# Patient Record
Sex: FEMALE | Race: WHITE | NOT HISPANIC OR LATINO | Employment: UNEMPLOYED | URBAN - METROPOLITAN AREA
[De-identification: names, ages, dates, MRNs, and addresses within clinical notes are randomized per-mention and may not be internally consistent; named-entity substitution may affect disease eponyms.]

---

## 2024-04-12 VITALS
TEMPERATURE: 97.2 F | RESPIRATION RATE: 19 BRPM | HEIGHT: 59 IN | WEIGHT: 105 LBS | DIASTOLIC BLOOD PRESSURE: 71 MMHG | BODY MASS INDEX: 21.17 KG/M2 | OXYGEN SATURATION: 99 % | SYSTOLIC BLOOD PRESSURE: 125 MMHG | HEART RATE: 95 BPM

## 2024-04-12 PROCEDURE — 99283 EMERGENCY DEPT VISIT LOW MDM: CPT | Performed by: EMERGENCY MEDICINE

## 2024-04-12 ASSESSMENT — COLUMBIA-SUICIDE SEVERITY RATING SCALE - C-SSRS
1. IN THE PAST MONTH, HAVE YOU WISHED YOU WERE DEAD OR WISHED YOU COULD GO TO SLEEP AND NOT WAKE UP?: NO
6. HAVE YOU EVER DONE ANYTHING, STARTED TO DO ANYTHING, OR PREPARED TO DO ANYTHING TO END YOUR LIFE?: NO
2. HAVE YOU ACTUALLY HAD ANY THOUGHTS OF KILLING YOURSELF?: NO

## 2024-04-12 ASSESSMENT — PAIN SCALES - GENERAL: PAINLEVEL_OUTOF10: 8

## 2024-04-12 ASSESSMENT — PAIN DESCRIPTION - PAIN TYPE: TYPE: ACUTE PAIN

## 2024-04-12 ASSESSMENT — PAIN DESCRIPTION - LOCATION: LOCATION: TEETH

## 2024-04-12 ASSESSMENT — PAIN - FUNCTIONAL ASSESSMENT: PAIN_FUNCTIONAL_ASSESSMENT: 0-10

## 2024-04-12 ASSESSMENT — PAIN DESCRIPTION - DESCRIPTORS: DESCRIPTORS: ACHING;THROBBING;HEADACHE

## 2024-04-13 ENCOUNTER — HOSPITAL ENCOUNTER (EMERGENCY)
Facility: HOSPITAL | Age: 29
Discharge: HOME | End: 2024-04-13
Attending: EMERGENCY MEDICINE
Payer: COMMERCIAL

## 2024-04-13 DIAGNOSIS — K08.89 PAIN, DENTAL: Primary | ICD-10-CM

## 2024-04-13 RX ORDER — AMOXICILLIN 500 MG/1
500 TABLET, FILM COATED ORAL 3 TIMES DAILY
Qty: 30 TABLET | Refills: 0 | Status: SHIPPED | OUTPATIENT
Start: 2024-04-13 | End: 2024-04-23

## 2024-04-13 NOTE — ED PROVIDER NOTES
HPI   Chief Complaint   Patient presents with   • Dental Pain   • Oral Swelling       HPI: []  28-year-old white female no medical history comes in with dental pain.  She said for last 24 hours developed pain in her right upper last molar tooth.  No jaw swelling.  No trauma no falls.  No ear pain.  No tinnitus.  No headache.  No chest pain pressure heaviness fever chills cough congestion incontinence seizures syncope or near syncope.    Past history: None  Social: Patient denies tobacco alcohol drug abuse.  REVIEW OF SYSTEMS:    GENERAL.: No weight loss, fatigue, anorexia, insomnia, fever.    EYES: No vision loss, double vision, drainage, eye pain.    ENT: No pharyngitis, dry mouth.  Positive dental pain    CARDIOPULMONARY: No chest pain, palpitations, syncope, near syncope. No shortness of breath, cough, hemoptysis.    GI: No abdominal pain, change in bowel habits, melena, hematemesis, hematochezia, nausea, vomiting, diarrhea.    : No discharge, dysuria, frequency, urgency, hematuria.    MS: No limb pain, joint pain, joint swelling.    SKIN: No rashes.    PSYCH: No depression, anxiety, suicidality, homicidality.    Review of systems is otherwise negative unless stated above or in history of present illness.  Social history, family history, allergies reviewed.  PHYSICAL EXAM:    GENERAL: Vitals noted, no distress. Alert and oriented  x 3. Non-toxic.  Multiple facial piercings    EENT: TMs clear. Posterior oropharynx unremarkable. No meningismus. No LAD.  The last upper right molar tooth has very mild tenderness with some brent odontal  tenderness no obvious abscess.  No jaw swelling.    NECK: Supple. Nontender. No midline tenderness.     CARDIAC: Deferred    PULMONARY: Deferred    ABDOMEN: Deferred    EXTREMITIES: No peripheral edema. Negative Homans bilaterally, no cords.    SKIN: No rash. Intact.     NEURO: Grossly intact    MEDICAL DECISION MAKING:  Impression: #1 dental pain/infection  Plans and MDM: 28 female  comes in with dental pain no obvious abscess low concern for abscess or osteomyelitis or trench mouth, will be discharged home with amoxicillin NSAIDs dentistry follow-up with return precaution.                          Juan Coma Scale Score: 15                     Patient History   No past medical history on file.  No past surgical history on file.  No family history on file.  Social History     Tobacco Use   • Smoking status: Not on file   • Smokeless tobacco: Not on file   Substance Use Topics   • Alcohol use: Not on file   • Drug use: Not on file       Physical Exam   ED Triage Vitals [04/12/24 2259]   Temperature Heart Rate Respirations BP   36.2 °C (97.2 °F) 95 19 125/71      Pulse Ox Temp Source Heart Rate Source Patient Position   99 % Temporal Monitor --      BP Location FiO2 (%)     -- --       Physical Exam    ED Course & [facility]   ED Course as of 04/13/24 0233   Sat Apr 13, 2024   0230 On exam patient is comfortable she has no upper jaw swelling.  She does have some tenderness to palpation of her right upper last molar tooth with some mildly periodontal tenderness but no obvious abscess.  Patient be discharged home with NSAIDs for pain control, amoxicillin outpatient follow-up with dentistry with strict return precaution. [MT]      ED Course User Index  [MT] Isaias Schroeder MD         Diagnoses as of 04/13/24 0233   Pain, dental       Medical Decision Making      Procedure  Procedures     Isaias Schroeder MD  04/13/24 0233

## 2024-04-13 NOTE — ED TRIAGE NOTES
Patient arrived to ED from home with c/o tooth pain that is causing HA, jaw pain, neck and back pain. Patient has not been to dentist in about a year, so she's not sure what the problem is. Patient reports pressure to back of right eye. Patient denies bleeding of gums, denies difficulty breathing or swallowing. Patient is able to swallow but is having a hard time chewing. Patient reports possible blood in tooth but not around or from gums. Patient stable at this time.

## 2024-10-01 ENCOUNTER — LAB (OUTPATIENT)
Dept: LAB | Facility: LAB | Age: 29
End: 2024-10-01
Payer: COMMERCIAL

## 2024-10-01 ENCOUNTER — APPOINTMENT (OUTPATIENT)
Dept: PRIMARY CARE | Facility: CLINIC | Age: 29
End: 2024-10-01
Payer: COMMERCIAL

## 2024-10-01 VITALS
HEIGHT: 61 IN | DIASTOLIC BLOOD PRESSURE: 70 MMHG | BODY MASS INDEX: 19.75 KG/M2 | SYSTOLIC BLOOD PRESSURE: 120 MMHG | HEART RATE: 90 BPM | OXYGEN SATURATION: 100 % | TEMPERATURE: 97.4 F | WEIGHT: 104.6 LBS

## 2024-10-01 DIAGNOSIS — Z13.21 ENCOUNTER FOR SCREENING FOR NUTRITIONAL DISORDER: ICD-10-CM

## 2024-10-01 DIAGNOSIS — Z13.1 SCREENING FOR DIABETES MELLITUS: ICD-10-CM

## 2024-10-01 DIAGNOSIS — Z11.4 SCREENING FOR HIV (HUMAN IMMUNODEFICIENCY VIRUS): ICD-10-CM

## 2024-10-01 DIAGNOSIS — Q79.60 EHLERS-DANLOS DISEASE (HHS-HCC): ICD-10-CM

## 2024-10-01 DIAGNOSIS — Z13.0 SCREENING FOR DEFICIENCY ANEMIA: ICD-10-CM

## 2024-10-01 DIAGNOSIS — Z11.59 NEED FOR HEPATITIS C SCREENING TEST: ICD-10-CM

## 2024-10-01 DIAGNOSIS — Z13.220 SCREENING FOR HYPERLIPIDEMIA: ICD-10-CM

## 2024-10-01 DIAGNOSIS — K04.7 TOOTH INFECTION: ICD-10-CM

## 2024-10-01 DIAGNOSIS — Z00.00 WELLNESS EXAMINATION: Primary | ICD-10-CM

## 2024-10-01 LAB
25(OH)D3 SERPL-MCNC: 16 NG/ML (ref 30–100)
ALBUMIN SERPL BCP-MCNC: 4.7 G/DL (ref 3.4–5)
ALP SERPL-CCNC: 41 U/L (ref 33–110)
ALT SERPL W P-5'-P-CCNC: 12 U/L (ref 7–45)
ANION GAP SERPL CALC-SCNC: 11 MMOL/L (ref 10–20)
AST SERPL W P-5'-P-CCNC: 15 U/L (ref 9–39)
BASOPHILS # BLD AUTO: 0.09 X10*3/UL (ref 0–0.1)
BASOPHILS NFR BLD AUTO: 1.1 %
BILIRUB SERPL-MCNC: 0.8 MG/DL (ref 0–1.2)
BUN SERPL-MCNC: 4 MG/DL (ref 6–23)
CALCIUM SERPL-MCNC: 9.8 MG/DL (ref 8.6–10.3)
CHLORIDE SERPL-SCNC: 104 MMOL/L (ref 98–107)
CHOLEST SERPL-MCNC: 221 MG/DL (ref 0–199)
CHOLESTEROL/HDL RATIO: 2.6
CO2 SERPL-SCNC: 29 MMOL/L (ref 21–32)
CREAT SERPL-MCNC: 0.58 MG/DL (ref 0.5–1.05)
EGFRCR SERPLBLD CKD-EPI 2021: >90 ML/MIN/1.73M*2
EOSINOPHIL # BLD AUTO: 0.13 X10*3/UL (ref 0–0.7)
EOSINOPHIL NFR BLD AUTO: 1.5 %
ERYTHROCYTE [DISTWIDTH] IN BLOOD BY AUTOMATED COUNT: 12.1 % (ref 11.5–14.5)
FERRITIN SERPL-MCNC: 58 NG/ML (ref 8–150)
GLUCOSE SERPL-MCNC: 97 MG/DL (ref 74–99)
HCT VFR BLD AUTO: 44.4 % (ref 36–46)
HDLC SERPL-MCNC: 84.5 MG/DL
HGB BLD-MCNC: 14.5 G/DL (ref 12–16)
IMM GRANULOCYTES # BLD AUTO: 0.02 X10*3/UL (ref 0–0.7)
IMM GRANULOCYTES NFR BLD AUTO: 0.2 % (ref 0–0.9)
IRON SATN MFR SERPL: 28 % (ref 25–45)
IRON SERPL-MCNC: 105 UG/DL (ref 35–150)
LDLC SERPL CALC-MCNC: 120 MG/DL
LYMPHOCYTES # BLD AUTO: 3.43 X10*3/UL (ref 1.2–4.8)
LYMPHOCYTES NFR BLD AUTO: 40.8 %
MCH RBC QN AUTO: 29.8 PG (ref 26–34)
MCHC RBC AUTO-ENTMCNC: 32.7 G/DL (ref 32–36)
MCV RBC AUTO: 91 FL (ref 80–100)
MONOCYTES # BLD AUTO: 0.37 X10*3/UL (ref 0.1–1)
MONOCYTES NFR BLD AUTO: 4.4 %
NEUTROPHILS # BLD AUTO: 4.36 X10*3/UL (ref 1.2–7.7)
NEUTROPHILS NFR BLD AUTO: 52 %
NON HDL CHOLESTEROL: 137 MG/DL (ref 0–149)
NRBC BLD-RTO: 0 /100 WBCS (ref 0–0)
PLATELET # BLD AUTO: 338 X10*3/UL (ref 150–450)
POTASSIUM SERPL-SCNC: 4.3 MMOL/L (ref 3.5–5.3)
PROT SERPL-MCNC: 7.5 G/DL (ref 6.4–8.2)
RBC # BLD AUTO: 4.86 X10*6/UL (ref 4–5.2)
SODIUM SERPL-SCNC: 140 MMOL/L (ref 136–145)
TIBC SERPL-MCNC: 376 UG/DL (ref 240–445)
TRIGL SERPL-MCNC: 83 MG/DL (ref 0–149)
TSH SERPL-ACNC: 1.24 MIU/L (ref 0.44–3.98)
UIBC SERPL-MCNC: 271 UG/DL (ref 110–370)
VIT B12 SERPL-MCNC: 223 PG/ML (ref 211–911)
VLDL: 17 MG/DL (ref 0–40)
WBC # BLD AUTO: 8.4 X10*3/UL (ref 4.4–11.3)

## 2024-10-01 PROCEDURE — 82728 ASSAY OF FERRITIN: CPT

## 2024-10-01 PROCEDURE — 82306 VITAMIN D 25 HYDROXY: CPT

## 2024-10-01 PROCEDURE — 85025 COMPLETE CBC W/AUTO DIFF WBC: CPT

## 2024-10-01 PROCEDURE — 83540 ASSAY OF IRON: CPT

## 2024-10-01 PROCEDURE — 86803 HEPATITIS C AB TEST: CPT

## 2024-10-01 PROCEDURE — 84443 ASSAY THYROID STIM HORMONE: CPT

## 2024-10-01 PROCEDURE — 87389 HIV-1 AG W/HIV-1&-2 AB AG IA: CPT

## 2024-10-01 PROCEDURE — 80053 COMPREHEN METABOLIC PANEL: CPT

## 2024-10-01 PROCEDURE — 83550 IRON BINDING TEST: CPT

## 2024-10-01 PROCEDURE — 83036 HEMOGLOBIN GLYCOSYLATED A1C: CPT

## 2024-10-01 PROCEDURE — 80061 LIPID PANEL: CPT

## 2024-10-01 PROCEDURE — 99395 PREV VISIT EST AGE 18-39: CPT | Performed by: FAMILY MEDICINE

## 2024-10-01 PROCEDURE — 82607 VITAMIN B-12: CPT

## 2024-10-01 PROCEDURE — 99213 OFFICE O/P EST LOW 20 MIN: CPT | Performed by: FAMILY MEDICINE

## 2024-10-01 PROCEDURE — 1036F TOBACCO NON-USER: CPT | Performed by: FAMILY MEDICINE

## 2024-10-01 PROCEDURE — 3008F BODY MASS INDEX DOCD: CPT | Performed by: FAMILY MEDICINE

## 2024-10-01 PROCEDURE — 36415 COLL VENOUS BLD VENIPUNCTURE: CPT

## 2024-10-01 ASSESSMENT — ENCOUNTER SYMPTOMS
FEVER: 0
SHORTNESS OF BREATH: 0
NAUSEA: 0
DIARRHEA: 0
ABDOMINAL PAIN: 0
DYSURIA: 0
COUGH: 0
VOMITING: 0
CHILLS: 0

## 2024-10-01 ASSESSMENT — PATIENT HEALTH QUESTIONNAIRE - PHQ9
1. LITTLE INTEREST OR PLEASURE IN DOING THINGS: NOT AT ALL
SUM OF ALL RESPONSES TO PHQ9 QUESTIONS 1 AND 2: 0
2. FEELING DOWN, DEPRESSED OR HOPELESS: NOT AT ALL

## 2024-10-01 NOTE — PROGRESS NOTES
"Subjective   Patient ID: Joao Moreno is a 29 y.o. female who presents for Annual Exam, hypermobility, and tooth infection.    Gloria has been dealing with some life stress.  She currently is in court regarding a evie.  Is following with a counselor and feels that she has good support.    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  Patient is concerned that she may have this to as she has noticed significant joint hypermobility in her elbows and knees.    Over the last few months she has had recurrent infections in her teeth.  Dentist is concerned there could be an underlying cause.  Recommended labs for further evaluation.         Review of Systems   Constitutional:  Negative for chills and fever.   Respiratory:  Negative for cough and shortness of breath.    Cardiovascular:  Negative for chest pain.   Gastrointestinal:  Negative for abdominal pain, diarrhea, nausea and vomiting.   Genitourinary:  Negative for dysuria.       Objective   /70   Pulse 90   Temp 36.3 °C (97.4 °F)   Ht 1.56 m (5' 1.42\")   Wt 47.4 kg (104 lb 9.6 oz)   SpO2 100%   BMI 19.50 kg/m²     Physical Exam  Constitutional:       General: She is not in acute distress.     Appearance: Normal appearance.   HENT:      Head: Normocephalic.      Mouth/Throat:      Mouth: Mucous membranes are moist.   Eyes:      Extraocular Movements: Extraocular movements intact.      Conjunctiva/sclera: Conjunctivae normal.   Cardiovascular:      Rate and Rhythm: Normal rate and regular rhythm.      Heart sounds: No murmur heard.  Pulmonary:      Breath sounds: No wheezing or rhonchi.   Musculoskeletal:      Cervical back: Neck supple.   Skin:     General: Skin is warm and dry.   Neurological:      Mental Status: She is alert.   Psychiatric:         Mood and Affect: Mood normal.         Behavior: Behavior normal.         Assessment/Plan   Problem List Items Addressed This Visit    None  Visit Diagnoses         " Codes    Wellness examination    -  Primary Z00.00    CPE performed. Vaccines reviewed. Routine labs ordered. Patient will schedule Pap smear.     Sloan-Danlos disease ([facility])     Q79.60    Possible Ehler's Danlos; patient does meet some criteria. Refer to genetics for testing.     Relevant Orders    Referral to Genetics    Tooth infection     K04.7    Following with dentist. Check labs to ensure no underlying cause for recurrent infection.     Screening for hyperlipidemia     Z13.220    Relevant Orders    Lipid Panel    Screening for diabetes mellitus     Z13.1    Relevant Orders    Comprehensive Metabolic Panel    Hemoglobin A1C    Screening for deficiency anemia     Z13.0    Relevant Orders    CBC and Auto Differential    Encounter for screening for nutritional disorder     Z13.21    Relevant Orders    TSH with reflex to Free T4 if abnormal    Vitamin D 25-Hydroxy,Total (for eval of Vitamin D levels)    Vitamin B12    Ferritin    Iron and TIBC    Screening for HIV (human immunodeficiency virus)     Z11.4    Relevant Orders    HIV 1/2 Antigen/Antibody Screen with Reflex to Confirmation    Need for hepatitis C screening test     Z11.59    Relevant Orders    Hepatitis C Antibody

## 2024-10-02 LAB
EST. AVERAGE GLUCOSE BLD GHB EST-MCNC: 103 MG/DL
HBA1C MFR BLD: 5.2 %
HCV AB SER QL: NONREACTIVE
HIV 1+2 AB+HIV1 P24 AG SERPL QL IA: NONREACTIVE

## 2024-10-08 ENCOUNTER — TELEPHONE (OUTPATIENT)
Dept: PRIMARY CARE | Facility: CLINIC | Age: 29
End: 2024-10-08
Payer: COMMERCIAL

## 2024-10-09 NOTE — TELEPHONE ENCOUNTER
Please let her know that overall her labs looked good.  The only abnormalities were low vitamin D and elevated cholesterol.  Her vitamin D was at 16, and it should be greater than 30.  I would recommend that she start taking vitamin D3 2000 I.U.  Per day.  Her cholesterol is mildly elevated.  I would recommend that she reduce dairy and red meat in her diet to improve her cholesterol.  Eating more beans and high fiber foods can also improve cholesterol.

## 2024-10-23 ENCOUNTER — APPOINTMENT (OUTPATIENT)
Dept: PRIMARY CARE | Facility: CLINIC | Age: 29
End: 2024-10-23
Payer: COMMERCIAL

## 2024-10-23 VITALS
DIASTOLIC BLOOD PRESSURE: 78 MMHG | BODY MASS INDEX: 19.57 KG/M2 | SYSTOLIC BLOOD PRESSURE: 132 MMHG | WEIGHT: 105 LBS | OXYGEN SATURATION: 100 % | TEMPERATURE: 97.8 F | HEART RATE: 100 BPM

## 2024-10-23 DIAGNOSIS — M24.9 HYPERMOBILITY OF JOINT: ICD-10-CM

## 2024-10-23 DIAGNOSIS — R42 DIZZINESS: Primary | ICD-10-CM

## 2024-10-23 DIAGNOSIS — G89.29 CHRONIC BILATERAL THORACIC BACK PAIN: ICD-10-CM

## 2024-10-23 DIAGNOSIS — M54.6 CHRONIC BILATERAL THORACIC BACK PAIN: ICD-10-CM

## 2024-10-23 DIAGNOSIS — R00.0 TACHYCARDIA: ICD-10-CM

## 2024-10-23 PROCEDURE — 99214 OFFICE O/P EST MOD 30 MIN: CPT | Performed by: FAMILY MEDICINE

## 2024-10-23 PROCEDURE — 93000 ELECTROCARDIOGRAM COMPLETE: CPT | Performed by: FAMILY MEDICINE

## 2024-10-23 PROCEDURE — 1036F TOBACCO NON-USER: CPT | Performed by: FAMILY MEDICINE

## 2024-10-23 ASSESSMENT — ENCOUNTER SYMPTOMS
COUGH: 0
FEVER: 0
WEAKNESS: 0
CHILLS: 0
DIZZINESS: 1
PALPITATIONS: 1

## 2024-10-23 NOTE — PROGRESS NOTES
Subjective   Patient ID: Gloria Moreno is a 29 y.o. female who presents for Follow-up (Discuss genetics referral ).    Gloria scheduled her genetics appointment for her joint hypermobility issues.  They are requesting that she have an echocardiogram prior to her appointment.  Patient reports that her mother does have Sloan-Danlos syndrome.  Is not sure if her mother had genetic testing done in the past.  Patient does not personally have any history of aneurysms, pneumothorax, or spontaneous rupture.    She has noticed that her heart rate has been running higher than normal.  When she is walking around her heart rate typically runs from 110 to 130 bpm.  She feels dizzy when she rises to a standing position.  She has not had any syncopal episodes.  Has been monitoring her heart rate with her watch.    She also has been experiencing pain in her back.  Pain is located from mid back up to her neck.  Feels tight when sitting.  Worse in certain positions.  No numbness or tingling in her arms.         Review of Systems   Constitutional:  Negative for chills and fever.   HENT:  Negative for congestion.    Respiratory:  Negative for cough.    Cardiovascular:  Positive for palpitations. Negative for chest pain.   Neurological:  Positive for dizziness. Negative for syncope and weakness.       Objective   /78   Pulse 100   Temp 36.6 °C (97.8 °F)   Wt 47.6 kg (105 lb)   SpO2 100%   BMI 19.57 kg/m²     Physical Exam  Constitutional:       General: She is not in acute distress.     Appearance: Normal appearance.   HENT:      Head: Normocephalic.      Mouth/Throat:      Mouth: Mucous membranes are moist.   Eyes:      Extraocular Movements: Extraocular movements intact.      Conjunctiva/sclera: Conjunctivae normal.   Cardiovascular:      Rate and Rhythm: Normal rate and regular rhythm.      Heart sounds: No murmur heard.  Pulmonary:      Breath sounds: No wheezing or rhonchi.   Musculoskeletal:      Cervical back: Neck  supple.      Thoracic back: Tenderness (bilateral erector spinae muscle group) present. No deformity or bony tenderness. Normal range of motion.   Skin:     General: Skin is warm and dry.   Neurological:      Mental Status: She is alert.   Psychiatric:         Mood and Affect: Mood normal.         Behavior: Behavior normal.         Assessment/Plan   Diagnoses and all orders for this visit:  Dizziness  Comments:  EKG performed. Orthostatics negative. Check echo.  Orders:  -     ECG 12 Lead  -     Check orthostatic blood pressure  -     Transthoracic Echo Complete; Future  Tachycardia  -     ECG 12 Lead  Hypermobility of joint  Comments:  Scheduled for follow-up with genetics.  Chronic bilateral thoracic back pain  Comments:  Recommend physical therapy but patient declined. Exercises printed. Return for OMT.

## 2024-11-15 ENCOUNTER — HOSPITAL ENCOUNTER (OUTPATIENT)
Dept: CARDIOLOGY | Facility: HOSPITAL | Age: 29
Discharge: HOME | End: 2024-11-15
Payer: COMMERCIAL

## 2024-11-15 DIAGNOSIS — R42 DIZZINESS: ICD-10-CM

## 2024-11-15 LAB
AORTIC VALVE MEAN GRADIENT: 3 MMHG
AORTIC VALVE PEAK VELOCITY: 1.12 M/S
AV PEAK GRADIENT: 5 MMHG
AVA (PEAK VEL): 2.15 CM2
AVA (VTI): 2.06 CM2
EJECTION FRACTION APICAL 4 CHAMBER: 45
EJECTION FRACTION: 58 %
LEFT ATRIUM VOLUME AREA LENGTH INDEX BSA: 18.2 ML/M2
LEFT VENTRICLE INTERNAL DIMENSION DIASTOLE: 4.9 CM (ref 3.5–6)
LEFT VENTRICULAR OUTFLOW TRACT DIAMETER: 1.9 CM
LV EJECTION FRACTION BIPLANE: 44 %
MITRAL VALVE E/A RATIO: 2.08
MITRAL VALVE E/E' RATIO: 5
RIGHT VENTRICLE FREE WALL PEAK S': 13.6 CM/S
RIGHT VENTRICLE PEAK SYSTOLIC PRESSURE: 15.8 MMHG
TRICUSPID ANNULAR PLANE SYSTOLIC EXCURSION: 2 CM

## 2024-11-15 PROCEDURE — 93306 TTE W/DOPPLER COMPLETE: CPT | Performed by: STUDENT IN AN ORGANIZED HEALTH CARE EDUCATION/TRAINING PROGRAM

## 2024-11-15 PROCEDURE — 93306 TTE W/DOPPLER COMPLETE: CPT

## 2024-11-18 ENCOUNTER — TELEPHONE (OUTPATIENT)
Dept: PRIMARY CARE | Facility: CLINIC | Age: 29
End: 2024-11-18
Payer: COMMERCIAL

## 2024-11-18 DIAGNOSIS — R93.1 ABNORMAL ECHOCARDIOGRAM: Primary | ICD-10-CM

## 2024-11-18 DIAGNOSIS — I87.9 ACQUIRED ABNORMALITY OF INFERIOR VENA CAVA: ICD-10-CM

## 2024-11-18 NOTE — TELEPHONE ENCOUNTER
Pt called requesting call about echo results. Pt saw results and are worried about them. please advise.

## 2024-11-18 NOTE — TELEPHONE ENCOUNTER
Please let her know that I reviewed her echo. The good news is that she has a normal ejection fracture and no complications from Ehler's-Danlos. Her imaging did show a shadow in her vena cava which is the major vein that goes to the heart. This shadow may be due to a prominent valve within her vein. There is a possibility that it could be a blood clot but my suspicion is low because when I saw her she was not having any shortness of breath (let me know if that changed). That said, I did place an order for additional imaging to further evaluate. She should receive a call to schedule soon.

## 2024-11-19 ENCOUNTER — APPOINTMENT (OUTPATIENT)
Dept: PRIMARY CARE | Facility: CLINIC | Age: 29
End: 2024-11-19
Payer: COMMERCIAL

## 2024-11-25 ENCOUNTER — APPOINTMENT (OUTPATIENT)
Dept: PRIMARY CARE | Facility: CLINIC | Age: 29
End: 2024-11-25
Payer: COMMERCIAL

## 2024-11-26 ENCOUNTER — APPOINTMENT (OUTPATIENT)
Dept: PRIMARY CARE | Facility: CLINIC | Age: 29
End: 2024-11-26
Payer: COMMERCIAL

## 2024-11-26 ENCOUNTER — TELEPHONE (OUTPATIENT)
Dept: PRIMARY CARE | Facility: CLINIC | Age: 29
End: 2024-11-26

## 2024-11-26 VITALS
WEIGHT: 105 LBS | DIASTOLIC BLOOD PRESSURE: 72 MMHG | OXYGEN SATURATION: 99 % | SYSTOLIC BLOOD PRESSURE: 108 MMHG | HEART RATE: 93 BPM | TEMPERATURE: 97.2 F | BODY MASS INDEX: 19.57 KG/M2

## 2024-11-26 DIAGNOSIS — M54.6 CHRONIC BILATERAL THORACIC BACK PAIN: ICD-10-CM

## 2024-11-26 DIAGNOSIS — G89.29 CHRONIC BILATERAL THORACIC BACK PAIN: ICD-10-CM

## 2024-11-26 DIAGNOSIS — M99.07 UPPER EXTREMITY SOMATIC DYSFUNCTION: ICD-10-CM

## 2024-11-26 DIAGNOSIS — R93.1 ABNORMAL ECHOCARDIOGRAM: Primary | ICD-10-CM

## 2024-11-26 DIAGNOSIS — M99.08 SOMATIC DYSFUNCTION OF RIB: ICD-10-CM

## 2024-11-26 DIAGNOSIS — M99.03 SOMATIC DYSFUNCTION OF SPINE, LUMBAR: ICD-10-CM

## 2024-11-26 DIAGNOSIS — M99.02 SOMATIC DYSFUNCTION OF SPINE, THORACIC: ICD-10-CM

## 2024-11-26 DIAGNOSIS — L91.0 KELOID: ICD-10-CM

## 2024-11-26 DIAGNOSIS — M99.01 SOMATIC DYSFUNCTION OF SPINE, CERVICAL: ICD-10-CM

## 2024-11-26 DIAGNOSIS — R23.9 SKIN CHANGE: ICD-10-CM

## 2024-11-26 PROBLEM — F41.1 GENERALIZED ANXIETY DISORDER: Status: ACTIVE | Noted: 2021-01-05

## 2024-11-26 PROCEDURE — 99214 OFFICE O/P EST MOD 30 MIN: CPT | Performed by: FAMILY MEDICINE

## 2024-11-26 PROCEDURE — 98927 OSTEOPATH MANJ 5-6 REGIONS: CPT | Performed by: FAMILY MEDICINE

## 2024-11-26 ASSESSMENT — ENCOUNTER SYMPTOMS
PALPITATIONS: 0
SHORTNESS OF BREATH: 0
BACK PAIN: 1
WHEEZING: 0
COUGH: 0

## 2024-11-26 NOTE — PROGRESS NOTES
Subjective   Patient ID: Gloria Moreno is a 29 y.o. female who presents for Back Pain (Recheck//Pt presents for OMT).    Gloria has been experiencing back pain in her upper back and neck.  Pain is worse after working.  She works as a .  Typically works with her head bent down.  She also has pain in the mid back.  No low back pain.  No radiation into her arms.  No arm weakness.    She recently had an echocardiogram done and would like to review the results.    She has some changes on the skin lesions on her back.  Has had 1 lesion on her right shoulder for a prolonged period of time and it is not changing.  She does have a new lesion on the left posterior shoulder that she would like to have examined.         Review of Systems   Respiratory:  Negative for cough, shortness of breath and wheezing.    Cardiovascular:  Negative for chest pain and palpitations.   Musculoskeletal:  Positive for back pain.   Skin:         New lesion at left shoulder       Objective   /72   Pulse 93   Temp 36.2 °C (97.2 °F)   Wt 47.6 kg (105 lb)   SpO2 99%   BMI 19.57 kg/m²     Physical Exam  Constitutional:       General: She is not in acute distress.     Appearance: Normal appearance.   HENT:      Head: Normocephalic and atraumatic.   Pulmonary:      Effort: Pulmonary effort is normal.   Musculoskeletal:      Cervical back: Neck supple.   Skin:     General: Skin is warm and dry.   Neurological:      General: No focal deficit present.      Mental Status: She is alert.      Motor: No weakness.      Coordination: Coordination normal.      Gait: Gait normal.   Psychiatric:         Mood and Affect: Mood normal.       Osteopathic exam:  - Cervical: C3 FRlSl  - Thoracic: T4-6 FRlSl  - Ribs: left first rib inhalation dysfunction  - Lumbar: L2 FRrSr  - Upper extremities: left subscapularis tissue texture changes and restriction      Assessment/Plan   Diagnoses and all orders for this visit:  Abnormal  echocardiogram  Comments:  [facility] [place]. Patient asymptomatic.  Chronic bilateral thoracic back pain  Comments:  Treated with OMT. Demonstrated stretches and recommend use of heating pad.  Keloid  Comments:  Refer to derm to discuss treatment options  Orders:  -     Referral to Dermatology  Skin change  Comments:  New lesion possible healing comedone.  Orders:  -     Referral to Dermatology    Somatic dysfunction treated with muscle energy, myofascial release and Still techinque. Patient tolerated treatment well and reported improved pain after treatment.    Recommend drinks fluids. Follow-up in 6-8 weeks for further treatment.

## 2024-11-26 NOTE — TELEPHONE ENCOUNTER
"Received a fax from [facility] Chest has been Denied. Needs PEER to PEER    To initiate 1-Sign in at [email or web address]                   2-Click \" Provider Resources\"                   3- Under \"Reference click Health Plan Call Center Phone Number    Please Advise  "

## 2024-11-26 NOTE — TELEPHONE ENCOUNTER
Spoke to physician. Notified Bridgette In auth department that insurance needs results of echo and most recent telephone message before peer-to-per can be completed.

## 2024-11-27 NOTE — TELEPHONE ENCOUNTER
[id number] completed. Prior auth info forwarded to auth deportment. Authorization # [id number] through 1/18/25.

## 2024-12-03 ENCOUNTER — HOSPITAL ENCOUNTER (OUTPATIENT)
Dept: RADIOLOGY | Facility: CLINIC | Age: 29
Discharge: HOME | End: 2024-12-03
Payer: COMMERCIAL

## 2024-12-03 ENCOUNTER — APPOINTMENT (OUTPATIENT)
Dept: PRIMARY CARE | Facility: CLINIC | Age: 29
End: 2024-12-03
Payer: COMMERCIAL

## 2024-12-03 VITALS
BODY MASS INDEX: 19.69 KG/M2 | WEIGHT: 107 LBS | SYSTOLIC BLOOD PRESSURE: 122 MMHG | DIASTOLIC BLOOD PRESSURE: 68 MMHG | OXYGEN SATURATION: 100 % | HEART RATE: 81 BPM | TEMPERATURE: 98.3 F | HEIGHT: 62 IN

## 2024-12-03 DIAGNOSIS — I87.9 ACQUIRED ABNORMALITY OF INFERIOR VENA CAVA: ICD-10-CM

## 2024-12-03 DIAGNOSIS — R93.1 ABNORMAL ECHOCARDIOGRAM: ICD-10-CM

## 2024-12-03 DIAGNOSIS — Z23 NEEDS FLU SHOT: ICD-10-CM

## 2024-12-03 DIAGNOSIS — Z12.4 SCREENING FOR CERVICAL CANCER: ICD-10-CM

## 2024-12-03 DIAGNOSIS — Z01.419 ENCNTR FOR GYN EXAM (GENERAL) (ROUTINE) W/O ABN FINDINGS: Primary | ICD-10-CM

## 2024-12-03 PROCEDURE — 90656 IIV3 VACC NO PRSV 0.5 ML IM: CPT | Performed by: FAMILY MEDICINE

## 2024-12-03 PROCEDURE — 87624 HPV HI-RISK TYP POOLED RSLT: CPT

## 2024-12-03 PROCEDURE — 90471 IMMUNIZATION ADMIN: CPT | Performed by: FAMILY MEDICINE

## 2024-12-03 PROCEDURE — 71260 CT THORAX DX C+: CPT

## 2024-12-03 PROCEDURE — 99395 PREV VISIT EST AGE 18-39: CPT | Performed by: FAMILY MEDICINE

## 2024-12-03 PROCEDURE — 2550000001 HC RX 255 CONTRASTS: Performed by: FAMILY MEDICINE

## 2024-12-03 PROCEDURE — 71260 CT THORAX DX C+: CPT | Performed by: RADIOLOGY

## 2024-12-03 ASSESSMENT — ENCOUNTER SYMPTOMS
FREQUENCY: 0
DYSURIA: 0
COUGH: 0

## 2024-12-03 NOTE — PROGRESS NOTES
"Subjective   Patient ID: Gloria Moreno is a 29 y.o. female who presents for Gynecologic Exam (Pt presents for Pap).    Gloria presents for her well woman exam. She has been feeling well. No new concerns.  She has not noticed any breast changes, masses, or pain.  She is not experiencing any pelvic pain.  No vaginal discharge or irregular menses.  She is not currently set sexually active currently.  She does not desire birth control at this time.         Review of Systems   HENT:  Negative for congestion.    Respiratory:  Negative for cough.    Genitourinary:  Negative for dysuria, frequency, pelvic pain, vaginal bleeding, vaginal discharge and vaginal pain.       Objective   /68   Pulse 81   Temp 36.8 °C (98.3 °F)   Ht 1.562 m (5' 1.5\")   Wt 48.5 kg (107 lb)   LMP 11/23/2024   SpO2 100%   BMI 19.89 kg/m²     Physical Exam  Constitutional:       General: She is not in acute distress.     Appearance: Normal appearance.   HENT:      Head: Normocephalic.      Mouth/Throat:      Mouth: Mucous membranes are moist.   Eyes:      Extraocular Movements: Extraocular movements intact.      Conjunctiva/sclera: Conjunctivae normal.   Cardiovascular:      Rate and Rhythm: Normal rate and regular rhythm.      Heart sounds: No murmur heard.  Pulmonary:      Breath sounds: No wheezing or rhonchi.   Chest:   Breasts:     Right: No swelling, inverted nipple, mass, nipple discharge, skin change or tenderness.      Left: No swelling, inverted nipple, mass, nipple discharge, skin change or tenderness.   Genitourinary:     Pubic Area: No rash.       Labia:         Right: No rash or lesion.         Left: No rash or lesion.       Vagina: No vaginal discharge, bleeding or lesions.      Cervix: No cervical motion tenderness.      Uterus: Not tender.       Comments: Chaperone declined.   Musculoskeletal:      Cervical back: Neck supple.   Skin:     General: Skin is warm and dry.   Neurological:      Mental Status: She is alert. "   Psychiatric:         Mood and Affect: Mood normal.         Behavior: Behavior normal.         Assessment/Plan   Diagnoses and all orders for this visit:  Encntr for gyn exam (general) (routine) w/o abn findings  Comments:  Pap performed. STD screening declined. Patient does not desire birth control at this time. Ok to call for referral to OBGYN if desires IUD.  Screening for cervical cancer  -     THINPREP PAP TEST  Needs flu shot  -     Flu vaccine, trivalent, preservative free, age 6 months and greater (Fluraix/Fluzone/Flulaval)

## 2025-01-15 ENCOUNTER — APPOINTMENT (OUTPATIENT)
Dept: PRIMARY CARE | Facility: CLINIC | Age: 30
End: 2025-01-15
Payer: COMMERCIAL

## 2025-01-15 VITALS
SYSTOLIC BLOOD PRESSURE: 94 MMHG | TEMPERATURE: 97.7 F | HEART RATE: 108 BPM | BODY MASS INDEX: 19.7 KG/M2 | OXYGEN SATURATION: 100 % | DIASTOLIC BLOOD PRESSURE: 68 MMHG | WEIGHT: 106 LBS

## 2025-01-15 DIAGNOSIS — M99.08 SOMATIC DYSFUNCTION OF RIB: ICD-10-CM

## 2025-01-15 DIAGNOSIS — M99.06 SOMATIC DYSFUNCTION OF BOTH LOWER EXTREMITIES: ICD-10-CM

## 2025-01-15 DIAGNOSIS — M54.2 NECK PAIN: ICD-10-CM

## 2025-01-15 DIAGNOSIS — J43.9 PULMONARY EMPHYSEMA, UNSPECIFIED EMPHYSEMA TYPE (MULTI): Primary | ICD-10-CM

## 2025-01-15 DIAGNOSIS — M99.03 SOMATIC DYSFUNCTION OF SPINE, LUMBAR: ICD-10-CM

## 2025-01-15 DIAGNOSIS — M99.01 SOMATIC DYSFUNCTION OF SPINE, CERVICAL: ICD-10-CM

## 2025-01-15 DIAGNOSIS — M99.04 SOMATIC DYSFUNCTION OF SPINE, SACRAL: ICD-10-CM

## 2025-01-15 DIAGNOSIS — R91.8 LUNG NODULES: ICD-10-CM

## 2025-01-15 DIAGNOSIS — M99.05 SOMATIC DYSFUNCTION OF PELVIC REGION: ICD-10-CM

## 2025-01-15 DIAGNOSIS — M99.02 SOMATIC DYSFUNCTION OF SPINE, THORACIC: ICD-10-CM

## 2025-01-15 DIAGNOSIS — N76.0 ACUTE VAGINITIS: ICD-10-CM

## 2025-01-15 PROCEDURE — 99214 OFFICE O/P EST MOD 30 MIN: CPT | Performed by: FAMILY MEDICINE

## 2025-01-15 PROCEDURE — 98928 OSTEOPATH MANJ 7-8 REGIONS: CPT | Performed by: FAMILY MEDICINE

## 2025-01-15 ASSESSMENT — ENCOUNTER SYMPTOMS
COUGH: 0
CHILLS: 0
NECK PAIN: 1
BACK PAIN: 1
FEVER: 0

## 2025-01-15 NOTE — PROGRESS NOTES
Subjective   Patient ID: Gloria Moreno is a 29 y.o. female who presents for Neck Pain (Discuss neck pain. Pt presents for OMT. ).    Pallavi has been having more neck pain and back pain recently. Thinks related to stress. Has been doing exercises and using heat.     She has noticed some vaginal irritation. Is about to start her period. No itching. Some small white discharge. Symptoms minimal but wanted ot make sure that Pap smear did not show bacterial vaginosis.     Would like to review CT results.          Review of Systems   Constitutional:  Negative for chills and fever.   HENT:  Negative for congestion.    Respiratory:  Negative for cough.    Genitourinary:  Positive for vaginal discharge. Negative for vaginal bleeding and vaginal pain.   Musculoskeletal:  Positive for back pain and neck pain.       Objective   BP 94/68   Pulse 108   Temp 36.5 °C (97.7 °F)   Wt 48.1 kg (106 lb)   SpO2 100%   BMI 19.70 kg/m²     Physical Exam  Constitutional:       General: She is not in acute distress.     Appearance: Normal appearance.   HENT:      Head: Normocephalic and atraumatic.   Pulmonary:      Effort: Pulmonary effort is normal.   Musculoskeletal:      Cervical back: Neck supple. Tenderness present. No swelling, deformity, spasms or bony tenderness. No pain with movement. Normal range of motion.      Thoracic back: Deformity present. No swelling or bony tenderness. Normal range of motion.      Lumbar back: Tenderness present. No swelling or bony tenderness. Decreased range of motion.   Skin:     General: Skin is warm and dry.   Neurological:      General: No focal deficit present.      Mental Status: She is alert.      Motor: No weakness.      Coordination: Coordination normal.      Gait: Gait normal.   Psychiatric:         Mood and Affect: Mood normal.       Osteopathic exam:  - Cervical: C3 FRlSl  - Thoracic: T4-6 FRlSl  - Ribs: left first rib inhalation dysfunction  - Lumbar: L2 FRrSr  - Pelvis: right  posterior innominate  - Sacrum: left flexion  - Lower extremities: right gluteus medius restriction      Assessment/Plan   Problem List Items Addressed This Visit             ICD-10-CM    Pulmonary emphysema (Multi) - Primary J43.9     Mild emphysema noted on 12/2024 CT. Recommend stop vaping and smoking marijuana.         Lung nodules R91.8     Due to nicotine use, recommend recheck chest CT in one year.          Relevant Orders    CT chest wo IV contrast     Other Visit Diagnoses         Codes    Neck pain     M54.2    Continue heating pad, stretching.     Acute vaginitis     N76.0    Patient reports minimal symptoms; declined testing at this time.     Somatic dysfunction of spine, cervical     M99.01    Somatic dysfunction of spine, thoracic     M99.02    Somatic dysfunction of rib     M99.08    Somatic dysfunction of pelvic region     M99.05    Somatic dysfunction of spine, lumbar     M99.03    Somatic dysfunction of spine, sacral     M99.04    Somatic dysfunction of both lower extremities     M99.06          Somatic dysfunction treated with muscle energy, myofascial release, FPR. Patient tolerated treatment well and reported improved pain after treatment.    Recommend drinks fluids. Follow-up as needed for further treatment.

## 2025-02-11 ENCOUNTER — APPOINTMENT (OUTPATIENT)
Dept: GENETICS | Facility: CLINIC | Age: 30
End: 2025-02-11
Payer: COMMERCIAL

## 2025-02-11 VITALS
BODY MASS INDEX: 21.6 KG/M2 | WEIGHT: 110 LBS | DIASTOLIC BLOOD PRESSURE: 76 MMHG | HEIGHT: 60 IN | SYSTOLIC BLOOD PRESSURE: 112 MMHG | TEMPERATURE: 98.3 F | RESPIRATION RATE: 18 BRPM | HEART RATE: 79 BPM

## 2025-02-11 DIAGNOSIS — Q79.60 EHLERS-DANLOS DISEASE (HHS-HCC): Primary | ICD-10-CM

## 2025-02-11 PROCEDURE — 3008F BODY MASS INDEX DOCD: CPT | Performed by: INTERNAL MEDICINE

## 2025-02-11 PROCEDURE — 99205 OFFICE O/P NEW HI 60 MIN: CPT | Performed by: INTERNAL MEDICINE

## 2025-02-11 RX ORDER — SERTRALINE HYDROCHLORIDE 25 MG/1
1 TABLET, FILM COATED ORAL
COMMUNITY
Start: 2025-01-29

## 2025-02-11 RX ORDER — HYDROXYZINE HYDROCHLORIDE 10 MG/1
TABLET, FILM COATED ORAL
COMMUNITY
Start: 2025-01-29

## 2025-02-11 ASSESSMENT — PATIENT HEALTH QUESTIONNAIRE - PHQ9
1. LITTLE INTEREST OR PLEASURE IN DOING THINGS: NOT AT ALL
SUM OF ALL RESPONSES TO PHQ9 QUESTIONS 1 & 2: 0
2. FEELING DOWN, DEPRESSED OR HOPELESS: NOT AT ALL

## 2025-02-11 ASSESSMENT — ENCOUNTER SYMPTOMS
OCCASIONAL FEELINGS OF UNSTEADINESS: 0
DEPRESSION: 0
LOSS OF SENSATION IN FEET: 0

## 2025-02-11 NOTE — PROGRESS NOTES
"  MEDICAL GENETICS INITIAL VISIT NOTE     Patient full name: Joao Moreno  MRN: [id number]  YOB: 1995   Present during this visit: The patient     Primary care and referring provider: Jackie Manning DO    Medical history was obtained from the patient. Prior to this appointment, I reviewed medical records from outpatient medical records and scanned documents, if available.     History of present illness:    Dear Dr. Manning:    It was a pleasure to see Ms. Moreno in clinic today. Ms. Moreno is a 29 y.o. female who was referred to Genetics for evaluation of inherited connective tissue disorders. Ms. Moreno has chronic longstanding musculoskeletal issues as outlined below. Pertinent negative and positive history related to inherited connective tissue disorders are as followings:     Musculoskeletal  - Hypermobility: Yes  - Joint laxity: Yes  - Joint dislocation: No  - Chronic widespread musculoskeletal pain: Yes  - Tendon/muscle rupture:  No  - Fractures: ?yes, as a child  - Scoliosis: ?  - Pectus differences: No  - Flat feet: Yes     Skin  - Spontaneous skin separation: No  - Easy bruising: Yes  - Skin hyperextensibility: Yes  - Stretch marks not related to weight change: Yes  - Abnormal scar formation, atrophic scar, wound dehiscence: slowly healed    - Early-onset varicose veins: No     Dental  - Reports \"weak teeth\". S/p 5-6 root canals done. No premature loss of teeth.   - Dental crowding: Yes  - Gingival disease: reports gingivitis     Cardiopulmonary:  - Orthostatic intolerance: Yes  presyncopal episodes. Would like to see a POTS specialist.   - Pneumothorax: No  - Echocardiogram: Yes, most recent 11/2024, without aortic root dilation.     - Spontaneous rupture/aneurysm/dissection of blood vessels: No    GI/  - IBS-like symptoms: Yes  cramping and epigastric pain.   - Hernia: No  - Rectal prolapse: No  - Pelvic/uterine prolapse: No  - Spontaneous rupture of " "internal organs: No  - Obstetric complications (e.g., significant vaginal tear, or postpartum hemorrhage): , first-trimester miscarriage.     Ocular/ENT  - Lens dislocation:  No  - High myopia:  reports ~ -2.5  - Confirmed hearing loss:  No    Neuropsychiatric:  - Migraines/headaches:  Yes   often  - Neuropathy:  Yes    Pediatric history: No congenital hip dislocation, congenital club feet, hypotonia, developmental delay, autism.    Other medical issues include CT scan showing mild emphysema and pulmonary nodules.     Social history:  A . Smoked 15 years, up to 1 PPD. No alcohol.      Family history:  Four-generation family tree was obtained and scanned to chart, under \"Genetics\" folder.  Pertinent history   Mom with \"EDS\", unknown details. No major health issues.   Dad with tendon/joint issues.   Pat uncle CRC 50s.  PGF (d) cancer NOS     No other family members with joint hypermobility/dislocation, sudden unexplained death, aortopathy, vascular dissection/aneurysm, ectopia lentis, rupture of internal organs, early deafness/blindness, or spontaneous pneumothorax.     Consanguinity: Denied      Physical examination:  Arm span to height ratio: 152:152 ~1  Upper segment to lower segment ratio: 74:78 ~1  GENERAL: The patient is alert, in no apparent distress.  Head shape is normal.  Face: No malar hypoplasia. Forehead, nose, philthrum, and chin appear normal.  Eyes: Not deep set. Palpebral fissures normally positioned. Sclerae not blue or icteric. PERRLA. No iridodonesis.  Ears: Not dysplastic, posteriorly rotated, or low set.  Oral cavity: High arched palate. No dental crowding. Normal uvula, normal dentition, no receding gum line or gingivitis.  CHEST/LUNGS: Pectus deferred. Lungs are clear bilaterally without rhonchi, rales, or wheezes.  HEART: RRR, no R/M/G.  EXTREMITIES/Back: Does not appear to have arachnodactyly. Cubitus valgus is noted. No piezogenic papules. Acrogeria-. No joint contractures. No " pretibial plaque. Hindfoot deformity-, flat feet+, scoliosis- by forward bending test. No peripheral edema. No varicose veins.   SKIN: Stretch marks deferred. Skin hyperextensibility+, no bruising, soft skin consistency+. Veins are visible at arms. Scar (L hand) is not atrophic.   NEUROLOGIC: EOMI without nystagmus. No ptosis. No facial palsy. Tongue in midline. No dysarthria. Normal gait without abnormal movement.   Psychiatric: Cooperative. Appropriate mood and affect.     Beighton score for generalized joint hypermobility  1. Passive dorsiflexion and hyperextension of the fifth MCP joint beyond 90°: 0  2. Passive apposition of the thumb to the flexor aspect of the forearm: 2  3. Passive hyperextension of the elbow beyond 10°:  2  4. Passive hyperextension of the knee beyond 10°:  0  5. Active forward flexion of the trunk with the knees fully extended so that the palms of the hands rest flat on the floor:  1  Total  5/9 (where a score of equal to or more than  5 is considered positive for age)     Systemic score for Marfan syndrome is  2 (pes planus +striae) where a score of equal to or more than 7 is considered positive systemic score.     Results:    Echo 11/15/2024  PHYSICIAN INTERPRETATION:  Left Ventricle: The left ventricular systolic function is normal, with a visually estimated ejection fraction of 55-60%. There are no regional wall motion abnormalities. The left ventricular cavity size is normal. Spectral Doppler shows a normal pattern of left ventricular diastolic filling.  Left Atrium: The left atrium is normal in size.  Right Ventricle: The right ventricle is normal in size. There is normal right ventricular global systolic function.  Right Atrium: The right atrium is normal in size.  Aortic Valve: The aortic valve is trileaflet. The aortic valve dimensionless index is 0.73. There is no evidence of aortic valve regurgitation. The peak instantaneous gradient of the aortic valve is 5 mmHg. The mean  "gradient of the aortic valve is 3 mmHg.  Mitral Valve: The mitral valve is normal in structure. There is trace mitral valve regurgitation.  Tricuspid Valve: The tricuspid valve is structurally normal. There is trace tricuspid regurgitation.  Pulmonic Valve: The pulmonic valve is structurally normal. There is physiologic pulmonic valve regurgitation.  Pericardium: No pericardial effusion noted.  Aorta: The aortic root is normal.  Systemic Veins: The inferior vena cava appears normal in size, with IVC inspiratory collapse greater than 50%.     CONCLUSIONS:   1. The left ventricular systolic function is normal, with a visually estimated ejection fraction of 55-60%.   2. There is normal right ventricular global systolic function.   3. There is linear echodensity visualized in IVC, correlate with presence of line/device. This may represent prominent eustachian valve, thrombus, or artifactual finding. Consider repeat imaging or alternative imaging such as CT for further evaluation as clincally indicated.    Assessment:  Ms. Moreno is a 29 y.o. female with phenotypes as outlined in the HPI and PE sections. Family history is notable for mother history of \"EDS\", unknown details. Echocardiogram was without aortic root dilatation; it was obtained 11/2024.      The patient has findings of connective tissue differences. These findings can be seen in 15%-20% of general population. Therefore, having joint hypermobility does not mean the person has a genetic disorder or needs genetic testing. More than 99% of individuals referred to Genetics for joint hypermobility do not have a medical condition with testable gene; most of them have a clinical diagnosis of hypermobility spectrum disorder (HSD) or hypermobile Sloan-Danlos syndrome (hEDS), both of which do not need genetic testing. It is recommended that individuals with joint hypermobility without red flag signs of genetic conditions with testable genes (such as aneurysm, " pneumothorax, rupture of internal organs, lens dislocation, among others) see PT and/or PM&R for symptomatic management.     I recommend an eye exam to assess findings of connective tissue disorders. Some findings, such as lens subluxation and vitreoretinal changes, may pinpoint toward certain diagnoses.     If the eye exam is abnormal, it is reasonable that we proceed with genetic testing.     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 Ms. Moreno does fulfill the strict diagnostic criteria for hEDS (scanned in chart). hEDS is the most common inherited connective tissue disorder and is the only subtype among 13 EDS subtypes that does not have a testable gene. Ms. Moreno does not have clinical features strongly suggestive of other subtypes of EDS, such as significant skin hyperextensibility, significant atrophic scars (usually with papyraceous and/or hemosideric appearance), premature loss of teeth, infantile hypotonia, bilateral congenital hip dislocation, vascular aneurysm/dissection, or spontaneous organ rupture.     Ms. Moreno does not have clinical features strongly suggestive of other subtypes of EDS, such as significant skin hyperextensibility, significant atrophic scars (usually with papyraceous and/or hemosideric appearance), premature loss of teeth, infantile hypotonia, bilateral congenital hip dislocation, vascular aneurysm/dissection, or spontaneous organ rupture.     Plan:  - A referral to Ophthalmology placed. Genetic testing only if eye exam is abnormal. If normal, dx is hEDS.   - Info re: hEDS provided.    - A referral to the autonomic clinic placed for autonomic workups given her symptoms.   - See local PT/PM&R for management of symptomatic joint hypermobility.   - Not discussed today: Workups for alpha-1 antitrypsin  deficiency are indicated per the ATS guidelines given emphysematous changes on CT scan.     Return to clinic after eye exam to discuss diagnosis and management.      Thank you for allowing me to participate in the care of this patient. Please do not hesitate to contact me if you have any questions.   Sincerely,     Cesia Adorno MD    Medical Geneticist  [facility]  Phone: [phone]  Fax: [phone]   Address: [address] [place] [address]  Time spent (this information is required by insurance): face-to-face 60min (11.13am-12.13pm); preparation 5min; documentation 20min; total time spent 85min

## 2025-04-02 ENCOUNTER — APPOINTMENT (OUTPATIENT)
Dept: DERMATOLOGY | Facility: CLINIC | Age: 30
End: 2025-04-02
Payer: COMMERCIAL

## 2025-04-02 DIAGNOSIS — L91.0 KELOID: Primary | ICD-10-CM

## 2025-04-02 DIAGNOSIS — L21.9 SEBORRHEIC DERMATITIS: ICD-10-CM

## 2025-04-02 PROCEDURE — 99203 OFFICE O/P NEW LOW 30 MIN: CPT | Performed by: NURSE PRACTITIONER

## 2025-04-02 PROCEDURE — 1036F TOBACCO NON-USER: CPT | Performed by: NURSE PRACTITIONER

## 2025-04-02 PROCEDURE — 11900 INJECT SKIN LESIONS </W 7: CPT | Performed by: NURSE PRACTITIONER

## 2025-04-02 RX ORDER — TRIAMCINOLONE ACETONIDE 40 MG/ML
40 INJECTION, SUSPENSION INTRA-ARTICULAR; INTRAMUSCULAR ONCE
Status: COMPLETED | OUTPATIENT
Start: 2025-04-02 | End: 2025-04-02

## 2025-04-02 RX ORDER — CICLOPIROX 1 G/100ML
SHAMPOO TOPICAL
Qty: 120 ML | Refills: 11 | Status: SHIPPED | OUTPATIENT
Start: 2025-04-02

## 2025-04-02 RX ADMIN — TRIAMCINOLONE ACETONIDE 40 MG: 40 INJECTION, SUSPENSION INTRA-ARTICULAR; INTRAMUSCULAR at 10:21

## 2025-04-02 NOTE — PROGRESS NOTES
Subjective     Gloria Moreno is a 29 y.o. female who presents for the following: Keloid (Pt presents for evaluation and treatment of keloids on the back.  ).     Review of Systems:  No other skin or systemic complaints other than what is documented elsewhere in the note.    The following portions of the chart were reviewed this encounter and updated as appropriate:  Tobacco  Allergies  Meds  Problems  Med Hx  Surg Hx  Fam Hx       Skin Cancer History  No skin cancer on file.    Specialty Problems    None    Past Medical History:  Gloria Moreno  has no past medical history on file.    Past Surgical History:  Gloria Moreno  has no past surgical history on file.    Family History:  Patient family history includes Sloan-Danlos syndrome in her mother.    Social History:  Gloria Moreno  reports that she has never smoked. She has never used smokeless tobacco. She reports that she does not currently use alcohol. She reports current drug use. Drug: Marijuana.    Allergies:  Patient has no known allergies.    Current Medications / CAM's:    Current Outpatient Medications:     buPROPion XL (Wellbutrin XL) 150 mg 24 hr tablet, Take 1 tablet (150 mg) by mouth once daily in the morning. Do not crush, chew, or split., Disp: 90 tablet, Rfl: 1    ciclopirox 1 % shampoo, Use 1-2 times per week. Lather and leave on scalp for 5-10 minutes, then rinse., Disp: 120 mL, Rfl: 11    hydrOXYzine pamoate (VistariL) 50 mg capsule, Take 1 capsule (50 mg) by mouth as needed at bedtime (insomnia)., Disp: 90 capsule, Rfl: 1    loratadine (Claritin) 10 mg tablet, Take 1 tablet (10 mg) by mouth once daily., Disp: , Rfl:     sertraline (Zoloft) 100 mg tablet, Take 1 tablet (100 mg) by mouth once daily., Disp: 90 tablet, Rfl: 1  No current facility-administered medications for this visit.     Objective   Well appearing patient in no apparent distress; mood and affect are within normal limits.    A focused skin  examination was performed. All findings within normal limits unless otherwise noted below.    Assessment/Plan   1. Keloid (2)  Left Upper Back, Right Upper Back  Shiny, thick, fibrotic pink-brown plaques    Scars and keloids  - While healing, your skin may develop scar tissue at the site of surgery, disease, burn, injury, or other trauma.   - The healing of injury sites normally produces a scar. Some individuals are more inclined to develop raised, reddish scars, or oversized, irregular, jagged scars.  - Keloids are more likely to occur in individuals with darker skin types. A keloid is an enlarged, smooth area of scar tissue that is larger than the original injury.  Wound care to reduce scarring includes:  - After an injury, clean the wound, then apply ointment and a bandage  - Keep cuts and scrapes clean daily  - Change bandages daily  - Return to the doctor on time for removal of any stitches  - Use sunscreen on scars  Plan  - ILK injections today, patient tolerated well.   - Risks, benefits, and side effects discussed, patient understood.   -Intralesional kenalog  40mg/ml x 0.4ml was injected to affected area(s) after prepping the skin with alcohol. Pt tolerated the procedure well with no complications. A total of 2 lesion(s) were treated.      Intralesional injection - Left Upper Back, Right Upper Back  Intralesional Injection:   Consent:     Consent obtained:  Verbal    Consent given by:  Patient    Risks discussed:  Poor cosmetic result, pain, infection and bleeding    Alternatives discussed:  No treatment  Pre-Procedure Details:     Prep Type:  Isopropyl alcohol  Procedure Details:   Injection:  Triamcinolone  Outcome: patient tolerated procedure well  Post-procedure details: sterile dressing applied and wound care instructions given  Dressing type: bandage   Comments:  A total of 0.4 ml of 40 mg/mL Kenalog were injected into 2 lesion(s)  Lot #:   Expiration:     Related Medications  triamcinolone acetonide  "(Kenalog-40) injection 40 mg      2. Seborrheic dermatitis  Scalp  Erythema with overlying greasy scale.    -Discussed the nature of the diagnosis  -There is no \"cure\" for this condition. Treatments will need to be continued long term to treat the condition  -Recommend: Start ciclopirox shampoo, use as directed.   - Risks, benefits, and side effects discussed. Patient understood and agrees with the plan.       Related Medications  ciclopirox 1 % shampoo  Use 1-2 times per week. Lather and leave on scalp for 5-10 minutes, then rinse.    Follow up in 8 weeks. Please call me if there are any changes or development of concerning symptoms (lesion/skin condition is changing, bleeding, enlarging, or worsening).      "